# Patient Record
Sex: MALE | Race: WHITE | Employment: OTHER | URBAN - METROPOLITAN AREA
[De-identification: names, ages, dates, MRNs, and addresses within clinical notes are randomized per-mention and may not be internally consistent; named-entity substitution may affect disease eponyms.]

---

## 2017-04-17 PROBLEM — M10.9 GOUT: Status: ACTIVE | Noted: 2017-04-17

## 2017-04-17 PROBLEM — Z98.890 H/O SHOULDER SURGERY: Status: ACTIVE | Noted: 2017-04-17

## 2017-04-17 PROBLEM — Z96.659 S/P KNEE REPLACEMENT: Status: ACTIVE | Noted: 2017-04-17

## 2017-05-22 PROBLEM — R76.8 HCV ANTIBODY POSITIVE: Status: ACTIVE | Noted: 2017-05-22

## 2022-03-18 PROBLEM — Z96.659 S/P KNEE REPLACEMENT: Status: ACTIVE | Noted: 2017-04-17

## 2022-03-19 PROBLEM — Z98.890 H/O SHOULDER SURGERY: Status: ACTIVE | Noted: 2017-04-17

## 2022-03-19 PROBLEM — R76.8 HCV ANTIBODY POSITIVE: Status: ACTIVE | Noted: 2017-05-22

## 2022-03-19 PROBLEM — K76.0 FATTY LIVER: Status: ACTIVE | Noted: 2017-04-17

## 2022-03-19 PROBLEM — M10.9 GOUT: Status: ACTIVE | Noted: 2017-04-17

## 2023-03-20 ENCOUNTER — TRANSCRIBE ORDER (OUTPATIENT)
Dept: SCHEDULING | Age: 78
End: 2023-03-20

## 2023-03-20 DIAGNOSIS — H47.013 ISCHEMIC OPTIC NEUROPATHY, BILATERAL: Primary | ICD-10-CM

## 2023-04-21 ENCOUNTER — OFFICE VISIT (OUTPATIENT)
Dept: INTERNAL MEDICINE CLINIC | Age: 78
End: 2023-04-21

## 2023-04-21 VITALS
HEART RATE: 75 BPM | BODY MASS INDEX: 33.38 KG/M2 | SYSTOLIC BLOOD PRESSURE: 130 MMHG | WEIGHT: 188.4 LBS | RESPIRATION RATE: 16 BRPM | OXYGEN SATURATION: 100 % | HEIGHT: 63 IN | TEMPERATURE: 98.7 F | DIASTOLIC BLOOD PRESSURE: 74 MMHG

## 2023-04-21 DIAGNOSIS — J02.9 SORE THROAT: ICD-10-CM

## 2023-04-21 DIAGNOSIS — R06.89 ABNORMAL BREATH SOUNDS: ICD-10-CM

## 2023-04-21 DIAGNOSIS — Z76.89 ENCOUNTER TO ESTABLISH CARE: ICD-10-CM

## 2023-04-21 DIAGNOSIS — L57.8 SUN-DAMAGED SKIN: ICD-10-CM

## 2023-04-21 DIAGNOSIS — J45.40 MODERATE PERSISTENT ASTHMA WITHOUT COMPLICATION: Primary | ICD-10-CM

## 2023-04-21 DIAGNOSIS — K21.00 GASTROESOPHAGEAL REFLUX DISEASE WITH ESOPHAGITIS WITHOUT HEMORRHAGE: ICD-10-CM

## 2023-04-21 DIAGNOSIS — R05.3 CHRONIC COUGH: ICD-10-CM

## 2023-04-21 RX ORDER — BENZONATATE 100 MG/1
100 CAPSULE ORAL
Qty: 30 CAPSULE | Refills: 0 | Status: SHIPPED | OUTPATIENT
Start: 2023-04-21 | End: 2023-04-28

## 2023-04-21 RX ORDER — PREDNISONE 5 MG/1
TABLET ORAL
Qty: 21 TABLET | Refills: 0 | Status: SHIPPED | OUTPATIENT
Start: 2023-04-21

## 2023-04-21 RX ORDER — PANTOPRAZOLE SODIUM 40 MG/1
40 TABLET, DELAYED RELEASE ORAL DAILY
Qty: 30 TABLET | Refills: 2 | Status: SHIPPED | OUTPATIENT
Start: 2023-04-21

## 2023-04-21 NOTE — PROGRESS NOTES
Chief Complaint   Patient presents with    Annual Wellness Visit       SUBJECTIVE:    Javier Toribio is a 69 y.o. male who is here today to establish care as a new patient and to discuss current medical symptoms. Patient states he has not been seen by a medical provider on a regular basis in several years now. The patient states a longstanding history of asthma and uses an albuterol inhaler as needed. However, since December 2022, he has been having to use it up to 3-4 times a day. He states he hears an audible wheeze, but denies any shortness of breath associated with this. He states his throat has become progressively more sore since this time, and also admits to morning sour brash as well as symptoms of reflux. He has a chronic cough that has developed as well, but has no sputum production. He states he traditionally does not go to medical providers for healthcare unless there is something wrong. He cannot recall the last time he had screening labs done, and is not particularly fond of medical providers in general.  Equally, he is not fond of medications in general.     He is retired, lives at home with his wife and has custody of their 3year-old grandchild. He spends a great deal of time outside and in the sun. He has significantly sun damaged skin as a result. Current Outpatient Medications   Medication Sig Dispense Refill    predniSONE (STERAPRED) 5 mg dose pack See administration instruction per 5mg dose pack 21 Tablet 0    benzonatate (TESSALON) 100 mg capsule Take 1 Capsule by mouth three (3) times daily as needed for Cough for up to 7 days. 30 Capsule 0    pantoprazole (PROTONIX) 40 mg tablet Take 1 Tablet by mouth daily. 30 Tablet 2    albuterol (PROVENTIL HFA, VENTOLIN HFA, PROAIR HFA) 90 mcg/actuation inhaler Take  by inhalation. allopurinol (ZYLOPRIM) 300 mg tablet Take  by mouth daily.  (Patient not taking: Reported on 4/21/2023)      dicyclomine (BENTYL) 10 mg capsule Take 10 mg by mouth 4 times daily (before meals and nightly). (Patient not taking: Reported on 4/21/2023)      aspirin delayed-release 81 mg tablet Take 81 mg by mouth every six (6) hours as needed for Pain.  (Patient not taking: Reported on 4/21/2023)       Past Medical History:   Diagnosis Date    Asthma     HTN (hypertension)      Past Surgical History:   Procedure Laterality Date    HX CHOLECYSTECTOMY  05/28/2021    HX HIP FRACTURE TX Right     1996    HX KNEE ARTHROSCOPY Left 1970    HX KNEE REPLACEMENT Right 2008     No Known Allergies    REVIEW OF SYSTEMS:                                        POSITIVE= bold text  Negative = regular text    General:                     fever, chills, sweats, generalized weakness, weight loss/gain,                                       loss of appetite   Eyes:                           blurred vision, eye pain, loss of vision, double vision  ENT:                            rhinorrhea, pharyngitis   Respiratory:               cough, sputum production, SOB, JACOBS, wheezing, pleuritic pain   Cardiology:                chest pain, palpitations, orthopnea, PND, edema, syncope   Gastrointestinal:       abdominal pain , N/V, diarrhea, dysphagia, constipation, bleeding   Genitourinary:           frequency, urgency, dysuria, hematuria, incontinence   Muskuloskeletal :      arthralgia, myalgia, back pain  Hematology:              easy bruising, nose or gum bleeding, lymphadenopathy   Dermatological:         rash, ulceration, pruritis, color change / jaundice  Endocrine:                 hot flashes or polydipsia   Neurological:             headache, dizziness, confusion, focal weakness, paresthesia,                                      Speech difficulties, memory loss, gait difficulty  Psychological:          Feelings of anxiety, depression, agitation        Social History     Socioeconomic History    Marital status: SINGLE   Tobacco Use    Smoking status: Never    Smokeless tobacco: Never Substance and Sexual Activity    Alcohol use: No    Drug use: No     Social Determinants of Health     Financial Resource Strain: Low Risk     Difficulty of Paying Living Expenses: Not hard at all   Food Insecurity: No Food Insecurity    Worried About Running Out of Food in the Last Year: Never true    Ran Out of Food in the Last Year: Never true     History reviewed. No pertinent family history. OBJECTIVE:     Visit Vitals  /74 (BP 1 Location: Left arm, BP Patient Position: Sitting, BP Cuff Size: Adult)   Pulse 75   Temp 98.7 °F (37.1 °C) (Oral)   Resp 16   Ht 5' 3\" (1.6 m)   Wt 188 lb 6.4 oz (85.5 kg)   SpO2 100%   BMI 33.37 kg/m²       Constitutional: He appears well nourished, of stated age, and dressed appropriately. Eyes: Sclera anicteric, PERRLA, EOMI  ENT: Throat is quite red with 2+ tonsillar hypertrophy but without exudate. Uvula is midline. Neck: There are palpable submandibular nodes noted. No supraclavicular nodes palpated. Thyroid normal, No JVD or bruits  Respiratory: Fuhs crackles X5, normal inspiratory effort, no wheezing or stridor. Cardiovascular: Regular rate and rhythm, no rubs or gallops, PMI not displaced, No thrills, no peripheral edema      ASSESSMENT/PLAN:     ICD-10-CM ICD-9-CM    1. Moderate persistent asthma without complication  C41.42 926.21       2. Abnormal breath sounds  R06.89 786.7 XR CHEST PA LAT      3. Chronic cough  R05.3 786.2 XR CHEST PA LAT      predniSONE (STERAPRED) 5 mg dose pack      benzonatate (TESSALON) 100 mg capsule      4. Sore throat  J02.9 462 predniSONE (STERAPRED) 5 mg dose pack      5. Gastroesophageal reflux disease with esophagitis without hemorrhage  K21.00 530.81 pantoprazole (PROTONIX) 40 mg tablet     530.10       6. Sun-damaged skin  L57.8 692.79       7. Encounter to establish care  Z76.89 V65.8         1: Chest x-ray performed in office today. Will await radiology evaluation.   2: Patient will be started on pantoprazole 40 mg daily due to symptoms of reflux and secondary esophagitis. 3: Patient will be given prescription for prednisone due to tonsillar swelling. 4: Patient will be prescribed benzonatate capsules to take up to 3 times daily as needed for cough. 5: Recommended patient avoid unnecessary use of albuterol as this may be causing inflammation to his throat. 6: Suggested use of sunblock to skin to reduce risk. 7: Patient to follow-up with me in approximately 4 weeks for recheck and fasting labs. Patient states understanding and agrees with plan. Orders Placed This Encounter    XR CHEST PA LAT    predniSONE (STERAPRED) 5 mg dose pack    benzonatate (TESSALON) 100 mg capsule    pantoprazole (PROTONIX) 40 mg tablet         ATTENTION:   This medical record was transcribed using an electronic medical records system. Although proofread, it may and can contain electronic and spelling errors. Other human spelling and other errors may be present. Corrections may be executed at a later time. Please feel free to contact us for any clarifications as needed. Follow-up and Dispositions    Return in about 4 weeks (around 5/19/2023) for fasting labs, Follow up. Signed,  Jose D Mckeon.  Galina Nunez, MSN APRN FNP-BC

## 2023-04-21 NOTE — PROGRESS NOTES
Terald Mohs is a 69 y.o. male     Chief Complaint   Patient presents with    Annual Wellness Visit       Visit Vitals  /74 (BP 1 Location: Left arm, BP Patient Position: Sitting, BP Cuff Size: Adult)   Pulse 75   Temp 98.7 °F (37.1 °C) (Oral)   Resp 16   Ht 5' 3\" (1.6 m)   Wt 188 lb 6.4 oz (85.5 kg)   SpO2 100%   BMI 33.37 kg/m²       Health Maintenance Due   Topic Date Due    Depression Screen  Never done    COVID-19 Vaccine (1) Never done    Shingles Vaccine (1 of 2) Never done    Pneumococcal 65+ years (1 - PCV) Never done    DTaP/Tdap/Td series (2 - Tdap) 07/05/2018    Medicare Yearly Exam  Never done         1. \"Have you been to the ER, urgent care clinic since your last visit? Hospitalized since your last visit? \" No    2. \"Have you seen or consulted any other health care providers outside of the [address] [place] [address] since your last visit? \" No     3. For patients aged 39-70: Has the patient had a colonoscopy / FIT/ Cologuard? Yes - no Care Gap present      If the patient is female:    4. For patients aged 41-77: Has the patient had a mammogram within the past 2 years? NA - based on age or sex      11. For patients aged 21-65: Has the patient had a pap smear?  NA - based on age or sex

## 2023-05-24 ENCOUNTER — OFFICE VISIT (OUTPATIENT)
Facility: CLINIC | Age: 78
End: 2023-05-24
Payer: MEDICARE

## 2023-05-24 VITALS
BODY MASS INDEX: 32.85 KG/M2 | DIASTOLIC BLOOD PRESSURE: 66 MMHG | HEART RATE: 84 BPM | HEIGHT: 63 IN | WEIGHT: 185.4 LBS | RESPIRATION RATE: 18 BRPM | SYSTOLIC BLOOD PRESSURE: 116 MMHG | TEMPERATURE: 98.4 F | OXYGEN SATURATION: 99 %

## 2023-05-24 DIAGNOSIS — Z12.5 SCREENING FOR MALIGNANT NEOPLASM OF PROSTATE: ICD-10-CM

## 2023-05-24 DIAGNOSIS — Z79.899 LONG TERM USE OF DRUG: ICD-10-CM

## 2023-05-24 DIAGNOSIS — L57.8 SUN-DAMAGED SKIN: ICD-10-CM

## 2023-05-24 DIAGNOSIS — Z00.00 ROUTINE PHYSICAL EXAMINATION: ICD-10-CM

## 2023-05-24 DIAGNOSIS — Z13.6 SCREENING FOR HEART DISEASE: ICD-10-CM

## 2023-05-24 DIAGNOSIS — R74.8 ELEVATED LIVER ENZYMES: ICD-10-CM

## 2023-05-24 DIAGNOSIS — Z00.00 MEDICARE ANNUAL WELLNESS VISIT, SUBSEQUENT: Primary | ICD-10-CM

## 2023-05-24 DIAGNOSIS — Z71.89 ACP (ADVANCE CARE PLANNING): ICD-10-CM

## 2023-05-24 DIAGNOSIS — K21.00 GASTRO-ESOPHAGEAL REFLUX DISEASE WITH ESOPHAGITIS, WITHOUT BLEEDING: ICD-10-CM

## 2023-05-24 DIAGNOSIS — R35.1 NOCTURIA: ICD-10-CM

## 2023-05-24 PROCEDURE — 1123F ACP DISCUSS/DSCN MKR DOCD: CPT | Performed by: NURSE PRACTITIONER

## 2023-05-24 PROCEDURE — G0439 PPPS, SUBSEQ VISIT: HCPCS | Performed by: NURSE PRACTITIONER

## 2023-05-24 RX ORDER — LATANOPROST 50 UG/ML
SOLUTION/ DROPS OPHTHALMIC
COMMUNITY
Start: 2023-04-24

## 2023-05-24 RX ORDER — PANTOPRAZOLE SODIUM 40 MG/1
TABLET, DELAYED RELEASE ORAL
COMMUNITY
Start: 2023-04-24

## 2023-05-24 SDOH — ECONOMIC STABILITY: FOOD INSECURITY: WITHIN THE PAST 12 MONTHS, YOU WORRIED THAT YOUR FOOD WOULD RUN OUT BEFORE YOU GOT MONEY TO BUY MORE.: NEVER TRUE

## 2023-05-24 SDOH — ECONOMIC STABILITY: INCOME INSECURITY: HOW HARD IS IT FOR YOU TO PAY FOR THE VERY BASICS LIKE FOOD, HOUSING, MEDICAL CARE, AND HEATING?: NOT HARD AT ALL

## 2023-05-24 SDOH — ECONOMIC STABILITY: HOUSING INSECURITY
IN THE LAST 12 MONTHS, WAS THERE A TIME WHEN YOU DID NOT HAVE A STEADY PLACE TO SLEEP OR SLEPT IN A SHELTER (INCLUDING NOW)?: NO

## 2023-05-24 SDOH — ECONOMIC STABILITY: FOOD INSECURITY: WITHIN THE PAST 12 MONTHS, THE FOOD YOU BOUGHT JUST DIDN'T LAST AND YOU DIDN'T HAVE MONEY TO GET MORE.: NEVER TRUE

## 2023-05-24 ASSESSMENT — PATIENT HEALTH QUESTIONNAIRE - PHQ9
SUM OF ALL RESPONSES TO PHQ9 QUESTIONS 1 & 2: 0
SUM OF ALL RESPONSES TO PHQ QUESTIONS 1-9: 0
2. FEELING DOWN, DEPRESSED OR HOPELESS: 0
SUM OF ALL RESPONSES TO PHQ QUESTIONS 1-9: 0
SUM OF ALL RESPONSES TO PHQ QUESTIONS 1-9: 0
1. LITTLE INTEREST OR PLEASURE IN DOING THINGS: 0
SUM OF ALL RESPONSES TO PHQ QUESTIONS 1-9: 0

## 2023-05-24 ASSESSMENT — LIFESTYLE VARIABLES
HOW OFTEN DO YOU HAVE A DRINK CONTAINING ALCOHOL: NEVER
HOW MANY STANDARD DRINKS CONTAINING ALCOHOL DO YOU HAVE ON A TYPICAL DAY: PATIENT DOES NOT DRINK

## 2023-05-24 NOTE — PATIENT INSTRUCTIONS

## 2023-05-24 NOTE — PROGRESS NOTES
Alonzo Bell is a 69 y.o. male     Chief Complaint   Patient presents with    Medicare AWV       /66 (Site: Left Upper Arm, Position: Sitting, Cuff Size: Large Adult)   Pulse 84   Temp 98.4 °F (36.9 °C) (Oral)   Resp 18   Ht 5' 3\" (1.6 m)   Wt 185 lb 6.4 oz (84.1 kg)   SpO2 99%   BMI 32.84 kg/m²     Health Maintenance Due   Topic Date Due    COVID-19 Vaccine (1) Never done    Hepatitis C screen  Never done    Shingles vaccine (1 of 2) Never done    Pneumococcal 65+ years Vaccine (1 - PCV) Never done    DTaP/Tdap/Td vaccine (2 - Tdap) 07/05/2018    Annual Wellness Visit (AWV)  Never done         1. \"Have you been to the ER, urgent care clinic since your last visit? Hospitalized since your last visit? \" No    2. \"Have you seen or consulted any other health care providers outside of the [address] [place] [address] since your last visit? \" No     3. For patients aged 39-70: Has the patient had a colonoscopy / FIT/ Cologuard? N/A      If the patient is female:    4. For patients aged 41-77: Has the patient had a mammogram within the past 2 years? N/A      5. For patients aged 21-65: Has the patient had a pap smear?  N/A

## 2023-05-24 NOTE — PROGRESS NOTES
Medicare Annual Wellness Visit    Kesha Chaves is here for Medicare AWV    Assessment & Plan   Medicare annual wellness visit, subsequent  Routine physical examination  -     CBC with Auto Differential; Future  -     Comprehensive Metabolic Panel; Future  -     Lipid Panel; Future  -     PSA Screening; Future  -     TSH; Future  -     Urinalysis; Future  Gastro-esophageal reflux disease with esophagitis, without bleeding  Elevated liver enzymes  -     CBC with Auto Differential; Future  -     Comprehensive Metabolic Panel; Future  Sun-damaged skin  Nocturia  -     Urinalysis; Future  Screening for malignant neoplasm of prostate  -     PSA Screening; Future  Screening for heart disease  -     Lipid Panel; Future  Long term use of drug  -     CBC with Auto Differential; Future      Recommendations for Preventive Services Due: see orders and patient instructions/AVS.  Recommended screening schedule for the next 5-10 years is provided to the patient in written form: see Patient Instructions/AVS.     Return in about 1 year (around 5/24/2024) for Follow up, Fasting labs. Subjective   The following acute and/or chronic problems were also addressed today: GERD    The patient continues to take pantoprazole daily for GERD and associated cough. He feels the medication has been somewhat helpful, but his cough remains present off and on throughout the day. He denies any hemoptysis or difficulty breathing. Patient also has a history of fatty liver disease as well as abnormal hepatitis screens. He has been seen by hepatologist about this. There was suggestion of further work-up, but the patient states he never did this. The patient admits to getting up 2-3 times nightly to empty his bladder. He denies any significant reduction in his urinary stream, incontinence, or signs of hematuria. Patient's complete Health Risk Assessment and screening values have been reviewed and are found in Flowsheets.  The following

## 2023-05-25 LAB
ALBUMIN SERPL-MCNC: 4 G/DL (ref 3.5–5)
ALBUMIN/GLOB SERPL: 1.1 (ref 1.1–2.2)
ALP SERPL-CCNC: 79 U/L (ref 45–117)
ALT SERPL-CCNC: 26 U/L (ref 12–78)
ANION GAP SERPL CALC-SCNC: 2 MMOL/L (ref 5–15)
APPEARANCE UR: CLEAR
AST SERPL-CCNC: 17 U/L (ref 15–37)
BACTERIA URNS QL MICRO: ABNORMAL /HPF
BASOPHILS # BLD: 0.1 K/UL (ref 0–0.1)
BASOPHILS NFR BLD: 1 % (ref 0–1)
BILIRUB SERPL-MCNC: 1.1 MG/DL (ref 0.2–1)
BILIRUB UR QL: NEGATIVE
BUN SERPL-MCNC: 20 MG/DL (ref 6–20)
BUN/CREAT SERPL: 17 (ref 12–20)
CALCIUM SERPL-MCNC: 9.3 MG/DL (ref 8.5–10.1)
CHLORIDE SERPL-SCNC: 105 MMOL/L (ref 97–108)
CHOLEST SERPL-MCNC: 168 MG/DL
CO2 SERPL-SCNC: 31 MMOL/L (ref 21–32)
COLOR UR: ABNORMAL
CREAT SERPL-MCNC: 1.21 MG/DL (ref 0.7–1.3)
DIFFERENTIAL METHOD BLD: ABNORMAL
EOSINOPHIL # BLD: 0.2 K/UL (ref 0–0.4)
EOSINOPHIL NFR BLD: 3 % (ref 0–7)
EPITH CASTS URNS QL MICRO: ABNORMAL /LPF
ERYTHROCYTE [DISTWIDTH] IN BLOOD BY AUTOMATED COUNT: 12.6 % (ref 11.5–14.5)
GLOBULIN SER CALC-MCNC: 3.8 G/DL (ref 2–4)
GLUCOSE SERPL-MCNC: 91 MG/DL (ref 65–100)
GLUCOSE UR STRIP.AUTO-MCNC: 100 MG/DL
HCT VFR BLD AUTO: 53.8 % (ref 36.6–50.3)
HDLC SERPL-MCNC: 53 MG/DL
HDLC SERPL: 3.2 (ref 0–5)
HGB BLD-MCNC: 16.9 G/DL (ref 12.1–17)
HGB UR QL STRIP: ABNORMAL
IMM GRANULOCYTES # BLD AUTO: 0 K/UL (ref 0–0.04)
IMM GRANULOCYTES NFR BLD AUTO: 0 % (ref 0–0.5)
KETONES UR QL STRIP.AUTO: NEGATIVE MG/DL
LDLC SERPL CALC-MCNC: 88.8 MG/DL (ref 0–100)
LEUKOCYTE ESTERASE UR QL STRIP.AUTO: NEGATIVE
LYMPHOCYTES # BLD: 2.3 K/UL (ref 0.8–3.5)
LYMPHOCYTES NFR BLD: 27 % (ref 12–49)
MCH RBC QN AUTO: 30 PG (ref 26–34)
MCHC RBC AUTO-ENTMCNC: 31.4 G/DL (ref 30–36.5)
MCV RBC AUTO: 95.4 FL (ref 80–99)
MONOCYTES # BLD: 0.9 K/UL (ref 0–1)
MONOCYTES NFR BLD: 11 % (ref 5–13)
NEUTS SEG # BLD: 4.8 K/UL (ref 1.8–8)
NEUTS SEG NFR BLD: 58 % (ref 32–75)
NITRITE UR QL STRIP.AUTO: NEGATIVE
NRBC # BLD: 0 K/UL (ref 0–0.01)
NRBC BLD-RTO: 0 PER 100 WBC
PH UR STRIP: 5.5 (ref 5–8)
PLATELET # BLD AUTO: 288 K/UL (ref 150–400)
PMV BLD AUTO: 9.9 FL (ref 8.9–12.9)
POTASSIUM SERPL-SCNC: 4.8 MMOL/L (ref 3.5–5.1)
PROT SERPL-MCNC: 7.8 G/DL (ref 6.4–8.2)
PROT UR STRIP-MCNC: NEGATIVE MG/DL
PSA SERPL-MCNC: 3.8 NG/ML (ref 0.01–4)
RBC # BLD AUTO: 5.64 M/UL (ref 4.1–5.7)
RBC #/AREA URNS HPF: ABNORMAL /HPF (ref 0–5)
SODIUM SERPL-SCNC: 138 MMOL/L (ref 136–145)
SP GR UR REFRACTOMETRY: 1.02 (ref 1–1.03)
SPECIMEN HOLD: NORMAL
TRIGL SERPL-MCNC: 131 MG/DL
TSH SERPL DL<=0.05 MIU/L-ACNC: 1.85 UIU/ML (ref 0.36–3.74)
UROBILINOGEN UR QL STRIP.AUTO: 0.2 EU/DL (ref 0.2–1)
VLDLC SERPL CALC-MCNC: 26.2 MG/DL
WBC # BLD AUTO: 8.3 K/UL (ref 4.1–11.1)
WBC URNS QL MICRO: ABNORMAL /HPF (ref 0–4)

## 2023-10-24 ENCOUNTER — TELEPHONE (OUTPATIENT)
Facility: CLINIC | Age: 78
End: 2023-10-24

## 2023-11-16 ENCOUNTER — OFFICE VISIT (OUTPATIENT)
Facility: CLINIC | Age: 78
End: 2023-11-16
Payer: MEDICARE

## 2023-11-16 VITALS
SYSTOLIC BLOOD PRESSURE: 122 MMHG | HEIGHT: 63 IN | OXYGEN SATURATION: 98 % | HEART RATE: 85 BPM | TEMPERATURE: 97.7 F | RESPIRATION RATE: 16 BRPM | DIASTOLIC BLOOD PRESSURE: 68 MMHG | BODY MASS INDEX: 32.74 KG/M2 | WEIGHT: 184.8 LBS

## 2023-11-16 DIAGNOSIS — M54.50 ACUTE LEFT-SIDED LOW BACK PAIN WITHOUT SCIATICA: Primary | ICD-10-CM

## 2023-11-16 DIAGNOSIS — M62.830 SPASM OF MUSCLE OF LOWER BACK: ICD-10-CM

## 2023-11-16 PROCEDURE — 1123F ACP DISCUSS/DSCN MKR DOCD: CPT | Performed by: NURSE PRACTITIONER

## 2023-11-16 PROCEDURE — 99213 OFFICE O/P EST LOW 20 MIN: CPT | Performed by: NURSE PRACTITIONER

## 2023-11-16 RX ORDER — METHYLPREDNISOLONE 4 MG/1
TABLET ORAL
Qty: 1 KIT | Refills: 0 | Status: SHIPPED | OUTPATIENT
Start: 2023-11-16

## 2023-11-16 RX ORDER — TIZANIDINE 4 MG/1
4 TABLET ORAL 3 TIMES DAILY PRN
Qty: 30 TABLET | Refills: 1 | Status: SHIPPED | OUTPATIENT
Start: 2023-11-16

## 2023-11-16 NOTE — PROGRESS NOTES
Chief Complaint   Patient presents with    Lower Back Pain     Lower left side back pain started 2 wks ago. SUBJECTIVE:    Vicky Pelayo is a 69 y.o. male who is here today with complaints of left-sided low back pain after doing some heavy lifting in his backyard approximately 2 weeks ago. He states he was lifting branches and logs after a tree had fallen in his backyard. He states he has been using Tylenol for pain as well as heat and ice packs. He denies any hematuria, hematochezia, or melena. He denies any bowel/bladder incontinence. He states his pain is worse with ambulation or in certain positions. Current Outpatient Medications   Medication Sig Dispense Refill    tiZANidine (ZANAFLEX) 4 MG tablet Take 1 tablet by mouth 3 times daily as needed (muscle spasm) 30 tablet 1    methylPREDNISolone (MEDROL DOSEPACK) 4 MG tablet Per package instructions. 1 kit 0    latanoprost (XALATAN) 0.005 % ophthalmic solution  (Patient not taking: Reported on 5/24/2023)      pantoprazole (PROTONIX) 40 MG tablet  (Patient not taking: Reported on 11/16/2023)       No current facility-administered medications for this visit.      Past Medical History:   Diagnosis Date    Asthma     HTN (hypertension)      Past Surgical History:   Procedure Laterality Date    CHOLECYSTECTOMY  05/28/2021    HIP FRACTURE SURGERY Right     1996    KNEE ARTHROSCOPY Left 1970    TOTAL KNEE ARTHROPLASTY Right 2008     No Known Allergies    REVIEW OF SYSTEMS:                                        POSITIVE= bold text  Negative = regular text    General:                     fever, chills, sweats, generalized weakness, weight loss/gain,                                       loss of appetite   Eyes:                           blurred vision, eye pain, loss of vision, double vision  ENT:                            rhinorrhea, pharyngitis   Respiratory:               cough, sputum production, SOB, SANTIAGO, wheezing, pleuritic pain   Cardiology: 13-Jun-2022 15:56

## 2023-12-13 ENCOUNTER — OFFICE VISIT (OUTPATIENT)
Facility: CLINIC | Age: 78
End: 2023-12-13
Payer: MEDICARE

## 2023-12-13 VITALS
HEART RATE: 39 BPM | WEIGHT: 184 LBS | TEMPERATURE: 98 F | DIASTOLIC BLOOD PRESSURE: 92 MMHG | OXYGEN SATURATION: 100 % | HEIGHT: 63 IN | SYSTOLIC BLOOD PRESSURE: 148 MMHG | BODY MASS INDEX: 32.6 KG/M2

## 2023-12-13 DIAGNOSIS — L30.9 ECZEMA, UNSPECIFIED TYPE: ICD-10-CM

## 2023-12-13 DIAGNOSIS — M54.50 ACUTE LEFT-SIDED LOW BACK PAIN WITHOUT SCIATICA: Primary | ICD-10-CM

## 2023-12-13 PROCEDURE — 99213 OFFICE O/P EST LOW 20 MIN: CPT | Performed by: PHYSICIAN ASSISTANT

## 2023-12-13 PROCEDURE — 1123F ACP DISCUSS/DSCN MKR DOCD: CPT | Performed by: PHYSICIAN ASSISTANT

## 2023-12-13 RX ORDER — PREDNISONE 10 MG/1
TABLET ORAL
Qty: 1 EACH | Refills: 0 | Status: SHIPPED | OUTPATIENT
Start: 2023-12-13

## 2023-12-13 RX ORDER — LIDOCAINE 50 MG/G
1 PATCH TOPICAL DAILY
Qty: 10 PATCH | Refills: 0 | Status: SHIPPED | OUTPATIENT
Start: 2023-12-13 | End: 2023-12-23

## 2023-12-13 NOTE — PATIENT INSTRUCTIONS
Do not take Advil or Aleve when on the steroids. You may take Tylenol as needed. Apply Cerave Cream to the legs. Avoid hot water. Take cool quick showers.

## 2024-01-16 ENCOUNTER — OFFICE VISIT (OUTPATIENT)
Facility: CLINIC | Age: 79
End: 2024-01-16
Payer: MEDICARE

## 2024-01-16 VITALS
SYSTOLIC BLOOD PRESSURE: 126 MMHG | DIASTOLIC BLOOD PRESSURE: 80 MMHG | RESPIRATION RATE: 16 BRPM | HEIGHT: 63 IN | OXYGEN SATURATION: 97 % | BODY MASS INDEX: 32.07 KG/M2 | HEART RATE: 88 BPM | TEMPERATURE: 97.9 F | WEIGHT: 181 LBS

## 2024-01-16 DIAGNOSIS — R10.2 PELVIC PAIN IN MALE: ICD-10-CM

## 2024-01-16 DIAGNOSIS — R10.32 SEVERE LEFT GROIN PAIN: Primary | ICD-10-CM

## 2024-01-16 PROCEDURE — 99213 OFFICE O/P EST LOW 20 MIN: CPT | Performed by: NURSE PRACTITIONER

## 2024-01-16 PROCEDURE — 1123F ACP DISCUSS/DSCN MKR DOCD: CPT | Performed by: NURSE PRACTITIONER

## 2024-01-16 RX ORDER — IBUPROFEN 600 MG/1
600 TABLET ORAL 3 TIMES DAILY PRN
Qty: 90 TABLET | Refills: 0 | Status: SHIPPED | OUTPATIENT
Start: 2024-01-16

## 2024-01-16 ASSESSMENT — PATIENT HEALTH QUESTIONNAIRE - PHQ9
1. LITTLE INTEREST OR PLEASURE IN DOING THINGS: 0
SUM OF ALL RESPONSES TO PHQ QUESTIONS 1-9: 0
2. FEELING DOWN, DEPRESSED OR HOPELESS: 0
SUM OF ALL RESPONSES TO PHQ QUESTIONS 1-9: 0
SUM OF ALL RESPONSES TO PHQ QUESTIONS 1-9: 0
SUM OF ALL RESPONSES TO PHQ9 QUESTIONS 1 & 2: 0
SUM OF ALL RESPONSES TO PHQ QUESTIONS 1-9: 0

## 2024-01-16 NOTE — PROGRESS NOTES
Chief Complaint   Patient presents with    Groin Pain     Burning sensation close to the groin area       SUBJECTIVE:    Loi Corbett is a 78 y.o. male who is here today with complaints of ongoing left groin pain which has been bothering him for the past 2+ months now.    Patient states he continues to have a burning and aching feeling to his left groin which radiates over and up into the anterior/superior portion of his pelvis.  He states the pain is often worse if he attempts to lay on his right side, and often causes a great deal of pain with any/all walking.  He states if he walks, he often has to rest in order to relieve his discomfort.  He denies any urinary changes, bowel pattern changes, hematuria, hematochezia, or melena.  He denies any rash, bruising, or trauma to the area.  He had previously been given a steroid pack which she felt was temporarily beneficial, but his symptoms returned once the medication wore off.    Current Outpatient Medications   Medication Sig Dispense Refill    predniSONE 10 MG (48) TBPK Take as directed per package instructions (Patient not taking: Reported on 1/16/2024) 1 each 0    tiZANidine (ZANAFLEX) 4 MG tablet Take 1 tablet by mouth 3 times daily as needed (muscle spasm) (Patient not taking: Reported on 12/13/2023) 30 tablet 1    methylPREDNISolone (MEDROL DOSEPACK) 4 MG tablet Per package instructions. (Patient not taking: Reported on 12/13/2023) 1 kit 0    latanoprost (XALATAN) 0.005 % ophthalmic solution  (Patient not taking: Reported on 5/24/2023)      pantoprazole (PROTONIX) 40 MG tablet  (Patient not taking: Reported on 11/16/2023)       No current facility-administered medications for this visit.     Past Medical History:   Diagnosis Date    Asthma     HTN (hypertension)      Past Surgical History:   Procedure Laterality Date    CHOLECYSTECTOMY  05/28/2021    HIP FRACTURE SURGERY Right     1996    KNEE ARTHROSCOPY Left 1970    TOTAL KNEE ARTHROPLASTY Right 2008

## 2024-01-16 NOTE — PROGRESS NOTES
Loi Corbett is a 78 y.o. male     Chief Complaint   Patient presents with    Groin Pain     Burning sensation close to the groin area       /80 (Site: Left Upper Arm, Position: Sitting, Cuff Size: Large Adult)   Pulse 88   Temp 97.9 °F (36.6 °C) (Oral)   Resp 16   Ht 1.6 m (5' 3\")   Wt 82.1 kg (181 lb)   SpO2 97%   BMI 32.06 kg/m²     Health Maintenance Due   Topic Date Due    COVID-19 Vaccine (1) Never done    Shingles vaccine (1 of 2) Never done    Respiratory Syncytial Virus (RSV) Pregnant or age 60 yrs+ (1 - 1-dose 60+ series) Never done    Pneumococcal 65+ years Vaccine (1 - PCV) Never done    DTaP/Tdap/Td vaccine (2 - Tdap) 07/05/2018    Flu vaccine (1) 08/01/2023    Annual Wellness Visit (Medicare Advantage)  01/01/2024         \"Have you been to the ER, urgent care clinic since your last visit?  Hospitalized since your last visit?\"    YES - When: approximately 2  weeks ago.  [facility]    “Have you seen or consulted any other health care providers outside of [facility] since your last visit?”    YES - When: approximately 7 days ago.  Where and Why: Orthopedic specialist.

## 2024-01-30 ENCOUNTER — HOSPITAL ENCOUNTER (OUTPATIENT)
Facility: HOSPITAL | Age: 79
Discharge: HOME | End: 2024-02-02
Payer: MEDICARE

## 2024-01-30 DIAGNOSIS — R10.2 PELVIC PAIN IN MALE: ICD-10-CM

## 2024-01-30 DIAGNOSIS — R10.32 SEVERE LEFT GROIN PAIN: ICD-10-CM

## 2024-01-30 LAB — CREAT BLD-MCNC: 1.2 MG/DL (ref 0.6–1.3)

## 2024-01-30 PROCEDURE — 6360000004 HC RX CONTRAST MEDICATION: Performed by: SURGERY

## 2024-01-30 PROCEDURE — 74177 CT ABD & PELVIS W/CONTRAST: CPT

## 2024-01-30 PROCEDURE — 82565 ASSAY OF CREATININE: CPT

## 2024-01-30 RX ADMIN — IOPAMIDOL 100 ML: 755 INJECTION, SOLUTION INTRAVENOUS at 09:27

## 2024-02-01 ENCOUNTER — TELEPHONE (OUTPATIENT)
Facility: CLINIC | Age: 79
End: 2024-02-01

## 2024-02-01 DIAGNOSIS — K40.20 BILATERAL INGUINAL HERNIA WITHOUT OBSTRUCTION OR GANGRENE, RECURRENCE NOT SPECIFIED: Primary | ICD-10-CM

## 2024-02-01 NOTE — TELEPHONE ENCOUNTER
Patient and his wife returned a call to the office in response to a message that was left that his CT scan results were back. Shared with patient the results as described by Miguelangel Briggs, and offered him the referral to general surgery per Miguelangel.     Patient would like a referral to a general surgeon at [facility]. He stated he will NOT GO TO [facility].

## 2024-02-14 ENCOUNTER — OFFICE VISIT (OUTPATIENT)
Age: 79
End: 2024-02-14
Payer: MEDICARE

## 2024-02-14 VITALS
WEIGHT: 186.6 LBS | BODY MASS INDEX: 33.05 KG/M2 | DIASTOLIC BLOOD PRESSURE: 85 MMHG | RESPIRATION RATE: 20 BRPM | TEMPERATURE: 98.3 F | HEART RATE: 69 BPM | OXYGEN SATURATION: 96 % | SYSTOLIC BLOOD PRESSURE: 130 MMHG

## 2024-02-14 DIAGNOSIS — K40.20 NON-RECURRENT BILATERAL INGUINAL HERNIA WITHOUT OBSTRUCTION OR GANGRENE: Primary | ICD-10-CM

## 2024-02-14 PROCEDURE — 1123F ACP DISCUSS/DSCN MKR DOCD: CPT | Performed by: SURGERY

## 2024-02-14 PROCEDURE — 99203 OFFICE O/P NEW LOW 30 MIN: CPT | Performed by: SURGERY

## 2024-02-14 RX ORDER — CHOLESTYRAMINE 4 G/9G
1 POWDER, FOR SUSPENSION ORAL 2 TIMES DAILY
Qty: 90 PACKET | Refills: 3 | Status: SHIPPED | OUTPATIENT
Start: 2024-02-14

## 2024-02-14 NOTE — PROGRESS NOTES
Loi Corbett is a 78 y.o. male who is referred by Miguelangel Briggs NP for further evaluation of bilateral inguinal hernias.     Information obtained from patient and review of chart.     Mr. Corbett tells me that he hurt his back several months ago after doing heavy lifting in his yard. Rx for a Medrol dose pack and a muscle relaxant. No significant improvement. Mr. Corbett was subsequently prescribed another medrol dose pack as well as lidocaine patches. Seen on January 16, 2024 for evaluation of ongoing left groin pain. Mr. Corbett was then sent for a CT scan of the abdomen/pelvis which revealed small, bilateral fat containing inguinal hernias. Mr. Corbett has not noted a bulge in either his right or left groin. Pain improving albeit slowly. No fevers or chills. No nausea or vomiting. Furthermore, Mr. Corbett reports loose stools after meals following cholecystectomy in the past.   He has otherwise been in his usual state of health.      CT scan abdomen/pelvis with IV contrast - 1/31/2024 - No acute process. Small fat-containing bilateral inguinal hernias.    Past Medical History:   Diagnosis Date    Asthma     HTN (hypertension)     Non-recurrent bilateral inguinal hernia without obstruction or gangrene 02/14/2024     Past Surgical History:   Procedure Laterality Date    CHOLECYSTECTOMY  05/28/2021    HIP FRACTURE SURGERY Right     1996    KNEE ARTHROSCOPY Left 1970    TOTAL KNEE ARTHROPLASTY Right 2008     History reviewed. No pertinent family history.    Social History     Socioeconomic History    Marital status: Single     Spouse name: None    Number of children: None    Years of education: None    Highest education level: None   Tobacco Use    Smoking status: Never     Passive exposure: Never    Smokeless tobacco: Never   Vaping Use    Vaping Use: Never used   Substance and Sexual Activity    Alcohol use: No    Drug use: No    Sexual activity: Yes     Partners: Female     Social Determinants of Health

## 2024-03-18 ENCOUNTER — OFFICE VISIT (OUTPATIENT)
Facility: CLINIC | Age: 79
End: 2024-03-18
Payer: MEDICARE

## 2024-03-18 VITALS
WEIGHT: 188 LBS | OXYGEN SATURATION: 99 % | SYSTOLIC BLOOD PRESSURE: 126 MMHG | DIASTOLIC BLOOD PRESSURE: 78 MMHG | BODY MASS INDEX: 33.31 KG/M2 | HEART RATE: 67 BPM | HEIGHT: 63 IN | TEMPERATURE: 98.2 F | RESPIRATION RATE: 16 BRPM

## 2024-03-18 DIAGNOSIS — S76.312A STRAIN OF LEFT HAMSTRING, INITIAL ENCOUNTER: ICD-10-CM

## 2024-03-18 DIAGNOSIS — L57.8 SUN-DAMAGED SKIN: ICD-10-CM

## 2024-03-18 DIAGNOSIS — M79.89 SWELLING OF LOWER EXTREMITY: Primary | ICD-10-CM

## 2024-03-18 PROCEDURE — 1123F ACP DISCUSS/DSCN MKR DOCD: CPT | Performed by: NURSE PRACTITIONER

## 2024-03-18 PROCEDURE — 99213 OFFICE O/P EST LOW 20 MIN: CPT | Performed by: NURSE PRACTITIONER

## 2024-03-18 RX ORDER — FUROSEMIDE 20 MG/1
TABLET ORAL
Qty: 30 TABLET | Refills: 5 | Status: SHIPPED | OUTPATIENT
Start: 2024-03-18

## 2024-03-18 RX ORDER — TIZANIDINE 2 MG/1
2 TABLET ORAL 3 TIMES DAILY PRN
Qty: 30 TABLET | Refills: 1 | Status: SHIPPED | OUTPATIENT
Start: 2024-03-18

## 2024-03-18 NOTE — PROGRESS NOTES
Loi Corbett is a 78 y.o. male     Chief Complaint   Patient presents with    Joint Swelling     Swelling behind both knees       /78 (Site: Left Upper Arm, Position: Sitting, Cuff Size: Medium Adult)   Pulse 67   Temp 98.2 °F (36.8 °C) (Oral)   Resp 16   Ht 1.6 m (5' 3\")   Wt 85.3 kg (188 lb)   SpO2 99%   BMI 33.30 kg/m²     Health Maintenance Due   Topic Date Due    COVID-19 Vaccine (1) Never done    Shingles vaccine (1 of 2) Never done    Respiratory Syncytial Virus (RSV) Pregnant or age 60 yrs+ (1 - 1-dose 60+ series) Never done    Pneumococcal 65+ years Vaccine (1 of 1 - PCV) Never done    DTaP/Tdap/Td vaccine (2 - Tdap) 07/05/2018    Flu vaccine (1) 08/01/2023    Annual Wellness Visit (Medicare Advantage)  01/01/2024         \"Have you been to the ER, urgent care clinic since your last visit?  Hospitalized since your last visit?\"    NO    “Have you seen or consulted any other health care providers outside of [facility] since your last visit?”    NO

## 2024-03-18 NOTE — PROGRESS NOTES
Chief Complaint   Patient presents with    Joint Swelling     Swelling behind both knees       SUBJECTIVE:    Loi Corbett is a 78 y.o. male who is here today with complaints of pain to posterior knee and thigh and slight swelling to right lower extremity.    Patient states he started developing pain behind his left knee approximately 2 weeks ago.  He states the pain is worse when attempting to walk which she does regularly.  He has been using some over-the-counter Aleve with minimal benefit.  He denies any trauma to the area.  However, he does admit to moving lots of heavy lumber just prior to onset of his symptoms.  He denies any significant redness or swelling.  He has been using a massager to the area which he feels has been slightly beneficial.    Additionally, he complains of some noticeable swelling to his right lower extremity.  He has a history of TKA to his right knee years ago.  He denies adding salt to his food on a regular basis.  He states the swelling has become uncomfortable at times, then resolves.      Current Outpatient Medications   Medication Sig Dispense Refill    furosemide (LASIX) 20 MG tablet Take 1 tablet daily for noticeable signs of swelling to lower extremities.  Use no more than 3 times a week. 30 tablet 5    tiZANidine (ZANAFLEX) 2 MG tablet Take 1 tablet by mouth 3 times daily as needed (muscle spasm) 30 tablet 1    ibuprofen (ADVIL;MOTRIN) 600 MG tablet Take 1 tablet by mouth 3 times daily as needed for Pain 90 tablet 0    cholestyramine (QUESTRAN) 4 g packet Take 1 packet by mouth 2 times daily (Patient not taking: Reported on 3/18/2024) 90 packet 3     No current facility-administered medications for this visit.     Past Medical History:   Diagnosis Date    Asthma     HTN (hypertension)     Non-recurrent bilateral inguinal hernia without obstruction or gangrene 02/14/2024     Past Surgical History:   Procedure Laterality Date    CHOLECYSTECTOMY  05/28/2021    HIP FRACTURE